# Patient Record
Sex: FEMALE | Race: WHITE | URBAN - METROPOLITAN AREA
[De-identification: names, ages, dates, MRNs, and addresses within clinical notes are randomized per-mention and may not be internally consistent; named-entity substitution may affect disease eponyms.]

---

## 2023-10-04 ENCOUNTER — HOSPITAL ENCOUNTER (EMERGENCY)
Age: 28
Discharge: HOME | End: 2023-10-04

## 2023-10-04 VITALS
TEMPERATURE: 98.8 F | HEART RATE: 86 BPM | RESPIRATION RATE: 18 BRPM | HEIGHT: 68 IN | BODY MASS INDEX: 34.86 KG/M2 | OXYGEN SATURATION: 98 % | WEIGHT: 230 LBS | SYSTOLIC BLOOD PRESSURE: 135 MMHG | DIASTOLIC BLOOD PRESSURE: 80 MMHG

## 2023-10-04 DIAGNOSIS — J03.90 ACUTE TONSILLITIS, UNSPECIFIED ETIOLOGY: Primary | ICD-10-CM

## 2023-10-04 PROCEDURE — 6360000002 HC RX W HCPCS

## 2023-10-04 PROCEDURE — 99283 EMERGENCY DEPT VISIT LOW MDM: CPT

## 2023-10-04 RX ORDER — METHYLPREDNISOLONE 4 MG/1
TABLET ORAL
Qty: 1 KIT | Refills: 0 | Status: SHIPPED | OUTPATIENT
Start: 2023-10-04 | End: 2023-10-10

## 2023-10-04 RX ORDER — ALBUTEROL SULFATE 90 UG/1
2 AEROSOL, METERED RESPIRATORY (INHALATION) EVERY 6 HOURS PRN
COMMUNITY

## 2023-10-04 RX ORDER — DEXAMETHASONE SODIUM PHOSPHATE 10 MG/ML
10 INJECTION INTRAMUSCULAR; INTRAVENOUS
Status: COMPLETED | OUTPATIENT
Start: 2023-10-04 | End: 2023-10-04

## 2023-10-04 RX ADMIN — DEXAMETHASONE SODIUM PHOSPHATE 10 MG: 10 INJECTION INTRAMUSCULAR; INTRAVENOUS at 19:13

## 2023-10-04 ASSESSMENT — PAIN DESCRIPTION - DESCRIPTORS: DESCRIPTORS: SORE

## 2023-10-04 ASSESSMENT — PAIN - FUNCTIONAL ASSESSMENT: PAIN_FUNCTIONAL_ASSESSMENT: 0-10

## 2023-10-04 ASSESSMENT — LIFESTYLE VARIABLES
HOW MANY STANDARD DRINKS CONTAINING ALCOHOL DO YOU HAVE ON A TYPICAL DAY: PATIENT DOES NOT DRINK
HOW OFTEN DO YOU HAVE A DRINK CONTAINING ALCOHOL: NEVER

## 2023-10-04 ASSESSMENT — PAIN SCALES - GENERAL: PAINLEVEL_OUTOF10: 6

## 2023-10-04 ASSESSMENT — PAIN DESCRIPTION - LOCATION: LOCATION: THROAT

## 2023-10-04 NOTE — ED PROVIDER NOTES
Pulmonary effort is normal. No respiratory distress. Musculoskeletal:         General: Normal range of motion. Neurological:      Mental Status: She is alert. Procedures     Procedures     No orders of the defined types were placed in this encounter. Medications given during this emergency department visit:  Medications   dexamethasone (DECADRON) Oral 10 mg (10 mg Oral Given 10/4/23 1913)       Discharge Medication List as of 10/4/2023  8:06 PM        START taking these medications    Details   methylPREDNISolone (MEDROL, MAU,) 4 MG tablet Take by mouth., Disp-1 kit, R-0Print              Past Medical History:   Diagnosis Date    Asthma         No past surgical history on file. No results found for any visits on 10/04/23. No orders to display         Voice dictation software was used during the making of this note. This software is not perfect and grammatical and other typographical errors may be present. This note has not been completely proofread for errors.      Jennifer Samson [place]  10/04/23 2945

## 2023-10-04 NOTE — ED TRIAGE NOTES
Pt ambulatory to triage with c/o throat pain and swelling. Pt currently being treated for mono. Given Toradol and Decadron with resolution for a few days but sx have returned and pt states she is having a hard time eating. Pt managing secretions and speaking in full sentences. Pt states dx was in [place] while working on the [facility] and is supposed to see and ENT here per her PCP instructions.

## 2023-10-05 NOTE — DISCHARGE INSTRUCTIONS
Take the Medrol steroid tablet pack as prescribed daily. I have also put in a referral to ENT for you have close follow-up. Follow-up with ENT. Follow-up with your primary care provider and return to the emergency department as needed.

## 2025-04-08 ENCOUNTER — HOSPITAL ENCOUNTER (EMERGENCY)
Age: 30
Discharge: HOME | End: 2025-04-08

## 2025-04-08 ENCOUNTER — APPOINTMENT (OUTPATIENT)
Dept: GENERAL RADIOLOGY | Age: 30
End: 2025-04-08

## 2025-04-08 VITALS
TEMPERATURE: 98.6 F | OXYGEN SATURATION: 100 % | DIASTOLIC BLOOD PRESSURE: 88 MMHG | HEART RATE: 78 BPM | SYSTOLIC BLOOD PRESSURE: 149 MMHG | RESPIRATION RATE: 16 BRPM

## 2025-04-08 DIAGNOSIS — M25.532 LEFT WRIST PAIN: Primary | ICD-10-CM

## 2025-04-08 PROCEDURE — 73110 X-RAY EXAM OF WRIST: CPT

## 2025-04-08 PROCEDURE — 99283 EMERGENCY DEPT VISIT LOW MDM: CPT

## 2025-04-08 ASSESSMENT — PAIN - FUNCTIONAL ASSESSMENT
PAIN_FUNCTIONAL_ASSESSMENT: 0-10
PAIN_FUNCTIONAL_ASSESSMENT: 0-10
PAIN_FUNCTIONAL_ASSESSMENT: ACTIVITIES ARE NOT PREVENTED

## 2025-04-08 ASSESSMENT — PAIN SCALES - GENERAL
PAINLEVEL_OUTOF10: 8
PAINLEVEL_OUTOF10: 7

## 2025-04-08 ASSESSMENT — PAIN DESCRIPTION - DESCRIPTORS: DESCRIPTORS: ACHING;SORE

## 2025-04-08 ASSESSMENT — PAIN DESCRIPTION - ORIENTATION
ORIENTATION: LEFT
ORIENTATION: LEFT

## 2025-04-08 ASSESSMENT — LIFESTYLE VARIABLES
HOW OFTEN DO YOU HAVE A DRINK CONTAINING ALCOHOL: NEVER
HOW MANY STANDARD DRINKS CONTAINING ALCOHOL DO YOU HAVE ON A TYPICAL DAY: PATIENT DOES NOT DRINK

## 2025-04-08 ASSESSMENT — PAIN DESCRIPTION - PAIN TYPE
TYPE: ACUTE PAIN
TYPE: ACUTE PAIN

## 2025-04-08 ASSESSMENT — ENCOUNTER SYMPTOMS
RESPIRATORY NEGATIVE: 1
GASTROINTESTINAL NEGATIVE: 1

## 2025-04-08 ASSESSMENT — PAIN DESCRIPTION - LOCATION
LOCATION: WRIST
LOCATION: WRIST

## 2025-04-08 ASSESSMENT — PAIN DESCRIPTION - FREQUENCY: FREQUENCY: CONTINUOUS

## 2025-04-08 ASSESSMENT — PAIN DESCRIPTION - ONSET: ONSET: GRADUAL

## 2025-04-08 NOTE — DISCHARGE INSTRUCTIONS
Call ortho for follow up as needed. Ice. Return if worsening. Pepcid for heartburn. Return if worsening or if you decide you want workup for this.

## 2025-04-08 NOTE — ED TRIAGE NOTES
Pt arrives to triage states she believes she injured her left wrist, pain has worsened with time. Started 2 weeks ago. Denies known injury but unsure if she hurt it while working out in gym.

## 2025-04-08 NOTE — ED PROVIDER NOTES
Emergency Department Provider Note       PCP: No, Pcp   Age: 29 y.o.   Sex: female     DISPOSITION Decision To Discharge 04/08/2025 11:30:43 AM    ICD-10-CM    1. Left wrist pain  M25.532 [facility]          Medical Decision Making     Patient presents for left wrist pain.  X-rays negative.  She also reports that she has had heartburn in the past few days.  Offered and recommended workup at this time but she refused.  She will return if it worsens or if she changes her mind.  Recommended Pepcid in the meantime. will place in splint for the left wrist and have her follow-up with Ortho.  Will avoid NSAIDs and steroids at this time as she is in with heartburn.  Recommend ice and rest.  Tylenol as needed.  She is to return if worsening     1 or more acute illnesses that pose a threat to life or bodily function.   Over the counter drug management performed.  Patient was discharged risks and benefits of hospitalization were considered.  Shared medical decision making was utilized in creating the patients health plan today.  I independently ordered and reviewed each unique test.           I interpreted the X-rays no acute fracture.              History     Two 9-year-old female presents with left wrist pain.  Symptoms been normal for about 2 weeks.  No known injury or trauma.  She has been doing a workout challenge and exercising regularly.  Pain is worse when she lifts weights.  She can flex, but extension is worse than the left wrist.  Had a remote fracture many years ago to the same wrist.  She also reports that she has had heartburn for the past few days.  She has changed her diet preceding the heart burn.  She tried home remedies without much improvement.        ROS     Review of Systems   Constitutional: Negative.    HENT: Negative.     Respiratory: Negative.     Cardiovascular: Negative.    Gastrointestinal: Negative.    Genitourinary: Negative.    Musculoskeletal:  Positive for